# Patient Record
(demographics unavailable — no encounter records)

---

## 2025-06-06 NOTE — PHYSICAL EXAM
[MA] : MA [Appropriately responsive] : appropriately responsive [Alert] : alert [No Acute Distress] : no acute distress [Oriented x3] : oriented x3 [Labia Majora] : normal [Scant] : There was scant vaginal bleeding [Normal] : normal [FreeTextEntry2] : ARELIS MALIK [Tenderness] : nontender [Enlarged ___ wks] : not enlarged

## 2025-06-06 NOTE — HISTORY OF PRESENT ILLNESS
[Excessive Bleeding] : bleeding has been excessive [Irregular Duration] : has been irregular [N] : Patient does not use contraception [Y] : Positive pregnancy history [Menarche Age: ____] : age at menarche was [unfilled] [No] : Patient does not have concerns regarding sex [PapSmeardate] : 9/25/24 [TextBox_31] : NEG [HIVDate] : 9/9/23 [TextBox_53] : NEG [SyphilisDate] : 9/9/23 [TextBox_58] : NEG [GonorrheaDate] : 9/25/24 [TextBox_63] : NEG [ChlamydiaDate] : 9/25/24 [TextBox_68] : NEG [HepatitisBDate] : 9/9/23 [TextBox_83] : NEG [HepatitisCDate] : 9/9/23 [TextBox_88] : NEG [LMPDate] : 5/26/25 [PGxTotal] : 1 [[facility] ServicesxFulerm] : 1 [[facility] She[facility] Centeriving] : 1 [FreeTextEntry1] : 5/26/25

## 2025-06-06 NOTE — DISCUSSION/SUMMARY
[FreeTextEntry1] : Assessment is dysfunctional uterine bleeding possible polycystic ovarian syndrome.  Will start patient on Junel.  All questions answered.  Follow-up for yearly or as needed.  Medical assistant Katherine was present in the room during entire examination and discussion.